# Patient Record
Sex: FEMALE | Race: WHITE | NOT HISPANIC OR LATINO | Employment: FULL TIME | URBAN - METROPOLITAN AREA
[De-identification: names, ages, dates, MRNs, and addresses within clinical notes are randomized per-mention and may not be internally consistent; named-entity substitution may affect disease eponyms.]

---

## 2023-11-21 NOTE — PROGRESS NOTES
Patient is a 33 year old  seen for pre-conception consultation in the context of a prior stillbirth. She had no complaints at the time of her visit today.     She denies any past medical complications or  complications other than the stillbirth. 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 Her parents and siblings are alive and in good health with the exception of cardiovascular disease in her father at an older age.     On the day of admission she experienced subjective decreased fetal movement after fetal movement the day before and presented for evaluation at which time a stillbirth was diagnosed with ultrasound. She underwent an otherwise uncomplicated labor and delivery and post-partum course. She did have some BP elevations though these were in the context of the diagnosis and she does not appear to have had pre-eclampsia specifically.     An ultrasound anatomic evaluation earlier in pregnancy had been reassuring. She has declined genetic screening during pregnancy. No obvious etiology was identified at the time of delivery itself, specifically there was no cord knot or nuchal cord. Autopsy was declined though there were no external signs of malformation. Photographs were available today on their cell phone and were reviewed. Negative KB, negative beta-2-glycoprotein. A maternal karyotype was normal, though this may have been intended as a fetal karyotype as they had desired fetal genetic testing which does not appear to have been proceed. She has been undergoing a grief process since delivery.     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     Visit Vitals  /86 (BP Location: Right arm, Patient Position: Sitting, BP Cuff Size: Adult)   Pulse 66     I reviewed potential etiologies of stillbirth. At this time there is not a clear etiology given the absence of risk factors or findings on ultrasound, placental pathology, laboratory testing or fetal  external evaluation. They are aware that a genetic or structural etiology cannot be completely ruled out without autopsy or fetal genetic testing, though these is no affirmative evidence of such concerns. We will order DRVVT and anti-cardiolipin to complete the APLS evaluation, though I do not expect that this will be positive. If it is positive I reviewed the false positive rate and need to repeat testing at 12 weeks. I reviewed that there is no current role in testing for other inherited thrombophilia.     Reviewed future pregnancy management in context of prior idiopathic stillbirth. Reviewed US for biometry at 30 and 36 weeks, NSTs for reassurance reasonable at 32 weeks. Reviewed that if otherwise reassuring delivery can occur at 39 weeks, though if of emotional benefit 38 or 37 weeks would be reasonable with counseling for early term delivery. I reviewed that a future pregnancy in this context is likely stressful even if everything goes well. The expressed understanding. I reviewed interpregnancy interval optimization.     In a future pregnancy we would be available as needed according to provider and patient preference for either consultation, management or ultrasound with availability as indicated.

## 2023-11-22 ENCOUNTER — INITIAL PRENATAL (OUTPATIENT)
Dept: MATERNAL FETAL MEDICINE | Facility: CLINIC | Age: 33
End: 2023-11-22
Payer: COMMERCIAL

## 2023-11-22 VITALS
WEIGHT: 190 LBS | HEIGHT: 66 IN | DIASTOLIC BLOOD PRESSURE: 86 MMHG | BODY MASS INDEX: 30.53 KG/M2 | SYSTOLIC BLOOD PRESSURE: 136 MMHG | HEART RATE: 66 BPM

## 2023-11-22 DIAGNOSIS — Z87.59 HISTORY OF STILLBIRTH: ICD-10-CM

## 2023-11-22 DIAGNOSIS — Z31.69 ENCOUNTER FOR PRECONCEPTION CONSULTATION: Primary | ICD-10-CM

## 2023-11-22 PROCEDURE — 99215 OFFICE O/P EST HI 40 MIN: CPT | Performed by: OBSTETRICS & GYNECOLOGY

## 2023-11-22 PROCEDURE — 99205 OFFICE O/P NEW HI 60 MIN: CPT | Performed by: OBSTETRICS & GYNECOLOGY

## 2023-11-22 ASSESSMENT — PAIN SCALES - GENERAL: PAINLEVEL: 2

## 2023-12-12 ENCOUNTER — LAB (OUTPATIENT)
Dept: LAB | Facility: LAB | Age: 33
End: 2023-12-12
Payer: COMMERCIAL

## 2023-12-12 DIAGNOSIS — Z87.59 HISTORY OF STILLBIRTH: ICD-10-CM

## 2023-12-12 LAB
CARDIOLIPIN IGA SERPL-ACNC: <0.5 APL U/ML
CARDIOLIPIN IGG SER IA-ACNC: <1.6 GPL U/ML
CARDIOLIPIN IGM SER IA-ACNC: 2.5 MPL U/ML

## 2023-12-12 PROCEDURE — 36415 COLL VENOUS BLD VENIPUNCTURE: CPT

## 2023-12-12 PROCEDURE — 86147 CARDIOLIPIN ANTIBODY EA IG: CPT

## 2023-12-12 PROCEDURE — 85613 RUSSELL VIPER VENOM DILUTED: CPT

## 2023-12-14 LAB
DRVVT SCREEN TO CONFIRM RATIO: 0.94 RATIO
DRVVT/DRVVT CFM NRMLZD PPP-RTO: 0.99 RATIO
DRVVT/DRVVT CFM P DOAC NEUT NORM PPP-RTO: 0.95 RATIO

## 2023-12-19 ENCOUNTER — TELEMEDICINE (OUTPATIENT)
Dept: BEHAVIORAL HEALTH | Facility: CLINIC | Age: 33
End: 2023-12-19
Payer: COMMERCIAL

## 2023-12-19 DIAGNOSIS — F43.21 COMPLICATED BEREAVEMENT: Primary | ICD-10-CM

## 2023-12-19 PROCEDURE — 90791 PSYCH DIAGNOSTIC EVALUATION: CPT | Performed by: PSYCHOLOGIST

## 2024-01-16 ENCOUNTER — TELEMEDICINE (OUTPATIENT)
Dept: BEHAVIORAL HEALTH | Facility: CLINIC | Age: 34
End: 2024-01-16
Payer: COMMERCIAL

## 2024-01-16 DIAGNOSIS — F43.21 COMPLICATED BEREAVEMENT: ICD-10-CM

## 2024-01-16 PROCEDURE — 90834 PSYTX W PT 45 MINUTES: CPT | Performed by: PSYCHOLOGIST

## 2024-01-16 NOTE — PROGRESS NOTES
"Behavioral Medicine Psychotherapy Progress Note      IDENTIFYING AND REFERRAL INFORMATION:  Yari Weber \"Luci\" is a 33 y.o. yo able-bodied  White female patient present for follow-up.      Start Time: 4 pm  End Time: 5 pm  Time Spent with Patient: 47 minutes    Session number 2 with this psychologist.    This is a virtual video visit.    Telephone/Televideo Informed Consent for Psychotherapy was reviewed with the patient as follows:  There are potential benefits and risks of the use of telephone or video-conferencing that differ from in-person sessions. Specifically, the telephone or televideo system we are using may not be HIPPA compliant and may present limits to patient confidentiality. Confidentiality still applies for telepsychology services, and nobody will record the session without your permission. You agree to use the telephone or video-conferencing platform selected for our virtual sessions, and I will explain how to use it.    1)             You need to use a webcam or smartphone during the session.  2)             It is important that you be in a quiet, private space that is free of distractions (including cell phone or other devices) during the session.  3)             It is important to use a secure internet connection rather than public/free Wi-Fi.  4)             It is important to be on time. If you need to cancel or change your tele-appointment, you must notify the psychologist in advance by phone or email.  5)             We need a back-up plan (e.g., phone number where you can be reached) to restart the session or to reschedule it, in the event of technical problems.  6)             We need a safety plan that includes at least one emergency contact and the closest emergency room to your location, in the event of a crisis situation.  7)             If you are not an adult, we need the permission of your parent or legal guardian (and their contact information) for you to participate " "in telepsychology sessions.    Understanding and verbal agreement was attested to by the patient.    The purpose of the meeting was reviewed as indicated, and limits of confidentiality reviewed as needed. Patient stated an understanding of the information and agreed to the session.       Symptoms: grief process after pregnancy loss, heightened concern about loved ones' health and safety when there may be threats to their health.     Focus of treatment: grief processing, coping resources  Modality of treatment: CBT  Frequency of treatment: about every 2 weeks      Session Content:  Continued grief processing. Feeling overwhelmed by activities she would normally not be overwhelmed by; not recognizing it could be due to fatigue of grief. Still using her coping skills, has been able to enjoy moments and feeling less guilt about experiencing enjoyment. He feels confident in her strengths, \"I know I can work through any of it.\" She expressed pride in her ability to get through the last four months. It has taught her her strength.     Having anxiety about the health of others' babies, most recently her brother's two week old daughter. Does allow herself to experience the emotion, is not avoiding it. Anxious about the health of her pets, both of whom are ill. New experiences for her, of being worried about things she didn't anticipate and has not been overly concerned about in the past. She is surprised that she was disappointed to find out she is not pregnant this month. She has been ambivalent about getting pregnant again, up to this point. She is unsure how to interpret these new experiences.     Additional historical information:   No children. 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     Current Psychiatric Medications:  None noted.     Objective:  Mental Status  Orientation and consciousness: [x ]oriented X 3 and attentive     [ ]other, explain:   Appearance/grooming :    [x ]appropriate [ " ]poor grooming/hygiene bizarre appearance    [ ]other, explain:    Behavior: [x ]appropriate to context.   [ ]inappropriate and/or bizarre, explain:    Speech: [x]normal rate/rhythm [ ]pressured speech []slow    [ ]other,    Language: [x ]normal    [ ]abnormalities noted, explain:    Mood: [ ]euthymic [ x]depressed [ ]dysphoric [ ]anxious    [ ]euphoric [ ] other   Affect: [x]mood congruent    []incongruent, explain:    Evidence of perceptual disturbances (hallucinations, illusions, etc.):    [ x]no    [ ]yes, explain:   Thought processes:     [x ]normal and congruent     [ ]other, explain:    Insight: [ ]good [x ]adequate [ ]poor []questionable   Judgment: [ ]good [x ]adequate [ ]poor []questionable   Fund of Knowledge:[ ]above average [x ]average [ ]below average    Risk Assessment  No lethality issues noted or observed. Patient denied suicidal or homicidal ideation, intent, or plan.  Current risk of harm low. Several protective factors.       Therapeutic Intervention:   [ x] Psychosocial  [ ] Treatment Goals  [ x] Cognitive/Behavioral- as appropriate for grief processing and emotional awareness  [ x] Psychoeducation   [ ] Insight/Psychodynamic  [ ] Problem solving  [ x] Supportive  [ ] Referral to additional/other treatment/resources      Diagnosis: complicated bereavement    Treatment Plan/Recommendations:   rtc in about 2 weeks for individual psychotherapy with this psychologist via video telehealth. Patient agreed to appointment frequency and plan. Patient has scheduling contact info to use as needed. Upcoming appointment already scheduled.       Prognosis: good    Progress to date: some, as noted.      Arnie Coelho, PhD

## 2024-02-13 ENCOUNTER — APPOINTMENT (OUTPATIENT)
Dept: BEHAVIORAL HEALTH | Facility: CLINIC | Age: 34
End: 2024-02-13
Payer: COMMERCIAL

## 2024-02-15 ENCOUNTER — APPOINTMENT (OUTPATIENT)
Dept: BEHAVIORAL HEALTH | Facility: CLINIC | Age: 34
End: 2024-02-15
Payer: COMMERCIAL

## 2024-12-18 ENCOUNTER — HOSPITAL ENCOUNTER (OUTPATIENT)
Dept: RADIOLOGY | Facility: CLINIC | Age: 34
Discharge: HOME | End: 2024-12-18
Payer: COMMERCIAL

## 2024-12-18 DIAGNOSIS — Z34.90 ENCOUNTER FOR SUPERVISION OF NORMAL PREGNANCY, UNSPECIFIED, UNSPECIFIED TRIMESTER: ICD-10-CM

## 2024-12-18 PROCEDURE — 76815 OB US LIMITED FETUS(S): CPT | Performed by: STUDENT IN AN ORGANIZED HEALTH CARE EDUCATION/TRAINING PROGRAM

## 2024-12-18 PROCEDURE — 76815 OB US LIMITED FETUS(S): CPT

## 2025-01-02 ENCOUNTER — APPOINTMENT (OUTPATIENT)
Dept: RADIOLOGY | Facility: HOSPITAL | Age: 35
End: 2025-01-02
Payer: COMMERCIAL

## 2025-01-06 ENCOUNTER — APPOINTMENT (OUTPATIENT)
Dept: RADIOLOGY | Facility: HOSPITAL | Age: 35
End: 2025-01-06
Payer: COMMERCIAL

## 2025-01-10 ENCOUNTER — HOSPITAL ENCOUNTER (OUTPATIENT)
Dept: RADIOLOGY | Facility: EXTERNAL LOCATION | Age: 35
Discharge: HOME | End: 2025-01-10

## 2025-01-14 ENCOUNTER — APPOINTMENT (OUTPATIENT)
Dept: RADIOLOGY | Facility: HOSPITAL | Age: 35
End: 2025-01-14
Payer: COMMERCIAL

## 2025-01-24 ENCOUNTER — HOSPITAL ENCOUNTER (OUTPATIENT)
Dept: RADIOLOGY | Facility: CLINIC | Age: 35
Discharge: HOME | End: 2025-01-24
Payer: COMMERCIAL

## 2025-01-24 DIAGNOSIS — Z34.90 ENCOUNTER FOR SUPERVISION OF NORMAL PREGNANCY, UNSPECIFIED, UNSPECIFIED TRIMESTER: ICD-10-CM

## 2025-01-24 PROCEDURE — 76811 OB US DETAILED SNGL FETUS: CPT

## 2025-01-30 ENCOUNTER — HOSPITAL ENCOUNTER (OUTPATIENT)
Dept: RADIOLOGY | Facility: HOSPITAL | Age: 35
Discharge: HOME | End: 2025-01-30
Payer: COMMERCIAL

## 2025-01-30 VITALS — BODY MASS INDEX: 28.93 KG/M2 | WEIGHT: 180 LBS | HEIGHT: 66 IN

## 2025-01-30 DIAGNOSIS — R92.8 OTHER ABNORMAL AND INCONCLUSIVE FINDINGS ON DIAGNOSTIC IMAGING OF BREAST: ICD-10-CM

## 2025-01-30 DIAGNOSIS — R92.8 ABNORMAL MAMMOGRAM: ICD-10-CM

## 2025-01-30 DIAGNOSIS — N63.0 BREAST LUMP: ICD-10-CM

## 2025-01-30 PROCEDURE — 76981 USE PARENCHYMA: CPT | Mod: RT

## 2025-01-30 PROCEDURE — 77062 BREAST TOMOSYNTHESIS BI: CPT

## 2025-01-30 PROCEDURE — 76642 ULTRASOUND BREAST LIMITED: CPT | Mod: RT

## 2025-03-29 LAB
NON-UH HIE 1 HR POST COLA GESTATIONAL GLUCOSE: 111 MG/DL (ref 120–135)
NON-UH HIE FERRITIN: 10 NG/ML (ref 10–291)
NON-UH HIE HCT: 36.6 % (ref 36–46)
NON-UH HIE HGB: 12.6 G/DL (ref 12–16)

## 2025-04-11 ENCOUNTER — HOSPITAL ENCOUNTER (OUTPATIENT)
Dept: RADIOLOGY | Facility: CLINIC | Age: 35
Discharge: HOME | End: 2025-04-11
Payer: COMMERCIAL

## 2025-04-11 DIAGNOSIS — O09.529 AMA (ADVANCED MATERNAL AGE) MULTIGRAVIDA 35+ (HHS-HCC): ICD-10-CM

## 2025-04-11 DIAGNOSIS — O09.299: ICD-10-CM

## 2025-04-11 DIAGNOSIS — Z34.90 ENCOUNTER FOR SUPERVISION OF NORMAL PREGNANCY, UNSPECIFIED, UNSPECIFIED TRIMESTER: ICD-10-CM

## 2025-04-11 PROCEDURE — 76816 OB US FOLLOW-UP PER FETUS: CPT

## 2025-04-11 PROCEDURE — 76819 FETAL BIOPHYS PROFIL W/O NST: CPT

## 2025-04-25 ENCOUNTER — HOSPITAL ENCOUNTER (OUTPATIENT)
Dept: RADIOLOGY | Facility: CLINIC | Age: 35
Discharge: HOME | End: 2025-04-25
Payer: COMMERCIAL

## 2025-04-25 DIAGNOSIS — Z34.90 ENCOUNTER FOR SUPERVISION OF NORMAL PREGNANCY, UNSPECIFIED, UNSPECIFIED TRIMESTER: ICD-10-CM

## 2025-04-25 DIAGNOSIS — O09.523 ADVANCED MATERNAL AGE IN MULTIGRAVIDA, THIRD TRIMESTER (HHS-HCC): ICD-10-CM

## 2025-04-25 DIAGNOSIS — O36.61X1: ICD-10-CM

## 2025-04-25 DIAGNOSIS — Z87.59 HISTORY OF IUFD: ICD-10-CM

## 2025-04-25 PROCEDURE — 76819 FETAL BIOPHYS PROFIL W/O NST: CPT

## 2025-04-25 PROCEDURE — 76815 OB US LIMITED FETUS(S): CPT

## 2025-05-02 ENCOUNTER — HOSPITAL ENCOUNTER (OUTPATIENT)
Dept: RADIOLOGY | Facility: CLINIC | Age: 35
Discharge: HOME | End: 2025-05-02
Payer: COMMERCIAL

## 2025-05-02 DIAGNOSIS — Z87.59 HISTORY OF STILLBIRTH: ICD-10-CM

## 2025-05-02 DIAGNOSIS — Z34.90 ENCOUNTER FOR SUPERVISION OF NORMAL PREGNANCY, UNSPECIFIED, UNSPECIFIED TRIMESTER: ICD-10-CM

## 2025-05-02 PROCEDURE — 76815 OB US LIMITED FETUS(S): CPT

## 2025-05-02 PROCEDURE — 76819 FETAL BIOPHYS PROFIL W/O NST: CPT

## 2025-05-09 ENCOUNTER — HOSPITAL ENCOUNTER (OUTPATIENT)
Dept: RADIOLOGY | Facility: CLINIC | Age: 35
Discharge: HOME | End: 2025-05-09
Payer: COMMERCIAL

## 2025-05-09 DIAGNOSIS — O09.293 SUPERVISION OF PREGNANCY WITH OTHER POOR REPRODUCTIVE OR OBSTETRIC HISTORY, THIRD TRIMESTER: ICD-10-CM

## 2025-05-09 DIAGNOSIS — Z34.90 ENCOUNTER FOR SUPERVISION OF NORMAL PREGNANCY, UNSPECIFIED, UNSPECIFIED TRIMESTER: ICD-10-CM

## 2025-05-09 PROCEDURE — 76819 FETAL BIOPHYS PROFIL W/O NST: CPT

## 2025-05-09 PROCEDURE — 76816 OB US FOLLOW-UP PER FETUS: CPT

## 2025-06-06 ENCOUNTER — APPOINTMENT (OUTPATIENT)
Dept: RADIOLOGY | Facility: CLINIC | Age: 35
End: 2025-06-06
Payer: COMMERCIAL

## 2025-07-16 LAB — NON-UH HIE GP HPV: NEGATIVE
